# Patient Record
Sex: FEMALE
[De-identification: names, ages, dates, MRNs, and addresses within clinical notes are randomized per-mention and may not be internally consistent; named-entity substitution may affect disease eponyms.]

---

## 2024-04-19 PROBLEM — Z00.00 ENCOUNTER FOR PREVENTIVE HEALTH EXAMINATION: Status: ACTIVE | Noted: 2024-04-19

## 2024-05-08 ENCOUNTER — APPOINTMENT (OUTPATIENT)
Dept: SURGERY | Facility: CLINIC | Age: 49
End: 2024-05-08
Payer: COMMERCIAL

## 2024-05-08 VITALS
WEIGHT: 132 LBS | RESPIRATION RATE: 17 BRPM | SYSTOLIC BLOOD PRESSURE: 106 MMHG | BODY MASS INDEX: 21.99 KG/M2 | OXYGEN SATURATION: 98 % | HEIGHT: 65 IN | TEMPERATURE: 98 F | HEART RATE: 79 BPM | DIASTOLIC BLOOD PRESSURE: 73 MMHG

## 2024-05-08 DIAGNOSIS — F17.200 NICOTINE DEPENDENCE, UNSPECIFIED, UNCOMPLICATED: ICD-10-CM

## 2024-05-08 DIAGNOSIS — Z83.3 FAMILY HISTORY OF DIABETES MELLITUS: ICD-10-CM

## 2024-05-08 DIAGNOSIS — Z82.49 FAMILY HISTORY OF ISCHEMIC HEART DISEASE AND OTHER DISEASES OF THE CIRCULATORY SYSTEM: ICD-10-CM

## 2024-05-08 DIAGNOSIS — Z78.9 OTHER SPECIFIED HEALTH STATUS: ICD-10-CM

## 2024-05-08 DIAGNOSIS — K57.32 DIVERTICULITIS OF LARGE INTESTINE W/OUT PERFORATION OR ABSCESS W/OUT BLEEDING: ICD-10-CM

## 2024-05-08 PROCEDURE — 99204 OFFICE O/P NEW MOD 45 MIN: CPT

## 2024-05-08 NOTE — ASSESSMENT
[FreeTextEntry1] : 49 yo female with multiple episodes of uncomplicated diverticulitis.  Developed C diff after the last ABX treatment. I discussed why we do elective surgery for diverticulitis and that the decision is balanced between risk of emergency surgery for diverticulitis vs risks of surgery. I explained lap sigmoid resection and its details, R/B/A of the procedure and expectations of recovery. Given multiple episodes and recent C diff dx, it would be reasonable to proceed with laparoscopic sigmoid resection. She would rather not have surgery at this point.  Asks about diet, no pain for several weeks now, I instructed her to transition to high fiber diet as tolerated. Would obtain CT abd/pelvis with recurrent symptoms - instructed her to call me in such case.   All questions answered. RTO as needed.

## 2024-05-08 NOTE — HISTORY OF PRESENT ILLNESS
[FreeTextEntry1] : Christina is a 49 yo female being seen for consultation, diverticulitis  Colonoscopy 2022 - Multiple diverticula were seen in the left side of the colon.  Diverticulosis appeared to be of mild severity.  Single sessile 5 mm polyp of benign appearance in the rectosigmoid junction.  Polypectomy.    CT- 09/05/19- Descending and proximal sigmoid diverticulitis without drainable abscess.                     - This is just distal focal thickening of the bowel. Please correlate with prior colonoscopy or                        follow up resolution of the pt's symptoms to exclude an underlying lesion.  CT 12/13/19 - Colonic diverticulosis without CT evidence for diverticulitis. CT 04/06/21- No evidence of diverticulitis                    - Bilateral renal parapelvic cyst without significant change.   Today pt reports feeling well, two months ago had LLQ abdominal pain.  More than 5 episodes of diverticulitis.  First episode 09/2019, most recent episode 2 months ago, treated by Dr. Saavedra with 10 days cipro, finished but had diarrhea, +C-diff, was given Vancomycin for 10 days.  No hospitalization.   BMs daily regular.  Denies nausea or vomiting.   No CT in last attack - last CT in 2022. Pain lasted for 2 weeks in last episode. Attack prior to this was 6 months prior. BMs 1-2/day, normal. Had vaginal PAOLO in 2004 with multiple procedures prior for 7 years. H/o lap merritt.

## 2024-05-08 NOTE — PHYSICAL EXAM
[Normal Breath Sounds] : Normal breath sounds [Normal Heart Sounds] : normal heart sounds [No Rash or Lesion] : No rash or lesion [Alert] : alert [Oriented to Person] : oriented to person [Oriented to Place] : oriented to place [Oriented to Time] : oriented to time [Calm] : calm [de-identified] : Abdomen is soft, NT, ND. [de-identified] : WNL [de-identified] : WNL [de-identified] : JACKL [de-identified] : WNL [de-identified] : WNL

## 2024-05-08 NOTE — CONSULT LETTER
[Dear  ___] : Dear  [unfilled], [Consult Letter:] : I had the pleasure of evaluating your patient, [unfilled]. [Please see my note below.] : Please see my note below. [Consult Closing:] : Thank you very much for allowing me to participate in the care of this patient.  If you have any questions, please do not hesitate to contact me. [Sincerely,] : Sincerely, [FreeTextEntry2] : Dr Jeremy Saavedra [FreeTextEntry3] : Elvia Rodriguez M.D., F.A.C.S., F.A.S.C.R.S. Assistant Professor of Surgery To ran [employer] at [facility]

## 2024-05-09 ENCOUNTER — TRANSCRIPTION ENCOUNTER (OUTPATIENT)
Age: 49
End: 2024-05-09

## 2024-12-12 ENCOUNTER — APPOINTMENT (OUTPATIENT)
Dept: CT IMAGING | Facility: CLINIC | Age: 49
End: 2024-12-12